# Patient Record
Sex: MALE | Race: WHITE | NOT HISPANIC OR LATINO | Employment: FULL TIME | URBAN - METROPOLITAN AREA
[De-identification: names, ages, dates, MRNs, and addresses within clinical notes are randomized per-mention and may not be internally consistent; named-entity substitution may affect disease eponyms.]

---

## 2024-02-16 ENCOUNTER — OFFICE VISIT (OUTPATIENT)
Dept: PRIMARY CARE | Facility: CLINIC | Age: 69
End: 2024-02-16
Payer: COMMERCIAL

## 2024-02-16 VITALS
WEIGHT: 264.4 LBS | HEIGHT: 75 IN | SYSTOLIC BLOOD PRESSURE: 134 MMHG | BODY MASS INDEX: 32.87 KG/M2 | HEART RATE: 65 BPM | DIASTOLIC BLOOD PRESSURE: 80 MMHG | OXYGEN SATURATION: 96 % | TEMPERATURE: 96.4 F

## 2024-02-16 DIAGNOSIS — K21.9 GASTROESOPHAGEAL REFLUX DISEASE WITHOUT ESOPHAGITIS: ICD-10-CM

## 2024-02-16 DIAGNOSIS — Z11.59 NEED FOR HEPATITIS C SCREENING TEST: ICD-10-CM

## 2024-02-16 DIAGNOSIS — R73.9 HYPERGLYCEMIA: ICD-10-CM

## 2024-02-16 DIAGNOSIS — I25.10 ATHEROSCLEROTIC CARDIOVASCULAR DISEASE: ICD-10-CM

## 2024-02-16 DIAGNOSIS — H61.23 EXCESSIVE CERUMEN IN BOTH EAR CANALS: ICD-10-CM

## 2024-02-16 DIAGNOSIS — Z12.11 COLON CANCER SCREENING: ICD-10-CM

## 2024-02-16 DIAGNOSIS — Z12.5 PROSTATE CANCER SCREENING: ICD-10-CM

## 2024-02-16 DIAGNOSIS — R91.8 PULMONARY NODULES: ICD-10-CM

## 2024-02-16 DIAGNOSIS — Z00.00 ROUTINE ADULT HEALTH MAINTENANCE: Primary | ICD-10-CM

## 2024-02-16 DIAGNOSIS — M79.605 LOWER EXTREMITY PAIN, BILATERAL: ICD-10-CM

## 2024-02-16 DIAGNOSIS — D69.6 THROMBOCYTOPENIA (CMS-HCC): ICD-10-CM

## 2024-02-16 DIAGNOSIS — Z13.6 SCREENING FOR AAA (ABDOMINAL AORTIC ANEURYSM): ICD-10-CM

## 2024-02-16 DIAGNOSIS — M79.604 LOWER EXTREMITY PAIN, BILATERAL: ICD-10-CM

## 2024-02-16 PROBLEM — D72.829 LEUKOCYTOSIS: Status: ACTIVE | Noted: 2024-02-16

## 2024-02-16 PROCEDURE — G0438 PPPS, INITIAL VISIT: HCPCS | Performed by: FAMILY MEDICINE

## 2024-02-16 PROCEDURE — 1160F RVW MEDS BY RX/DR IN RCRD: CPT | Performed by: FAMILY MEDICINE

## 2024-02-16 PROCEDURE — 1124F ACP DISCUSS-NO DSCNMKR DOCD: CPT | Performed by: FAMILY MEDICINE

## 2024-02-16 PROCEDURE — 1159F MED LIST DOCD IN RCRD: CPT | Performed by: FAMILY MEDICINE

## 2024-02-16 PROCEDURE — 1036F TOBACCO NON-USER: CPT | Performed by: FAMILY MEDICINE

## 2024-02-16 PROCEDURE — 1126F AMNT PAIN NOTED NONE PRSNT: CPT | Performed by: FAMILY MEDICINE

## 2024-02-16 PROCEDURE — 69210 REMOVE IMPACTED EAR WAX UNI: CPT | Performed by: FAMILY MEDICINE

## 2024-02-16 RX ORDER — CALCIUM CARB, CITRATE, MALATE 250 MG
CAPSULE ORAL EVERY 24 HOURS
COMMUNITY

## 2024-02-16 RX ORDER — IBUPROFEN 100 MG/5ML
SUSPENSION, ORAL (FINAL DOSE FORM) ORAL
COMMUNITY

## 2024-02-16 ASSESSMENT — PATIENT HEALTH QUESTIONNAIRE - PHQ9
2. FEELING DOWN, DEPRESSED OR HOPELESS: NOT AT ALL
SUM OF ALL RESPONSES TO PHQ9 QUESTIONS 1 AND 2: 0
1. LITTLE INTEREST OR PLEASURE IN DOING THINGS: NOT AT ALL

## 2024-02-16 ASSESSMENT — ENCOUNTER SYMPTOMS
OCCASIONAL FEELINGS OF UNSTEADINESS: 0
LOSS OF SENSATION IN FEET: 0
DEPRESSION: 0

## 2024-02-16 ASSESSMENT — PAIN SCALES - GENERAL: PAINLEVEL: 0-NO PAIN

## 2024-02-16 NOTE — ASSESSMENT & PLAN NOTE
- Excess earwax successfully removed in office  -Recommend routine use of OTC Debrox wax softening drops once a month to help cut down on buildup  -If you have further sensation of fullness, please contact office and we can evaluate

## 2024-02-16 NOTE — PROGRESS NOTES
Outpatient Visit Note    Chief Complaint   Patient presents with    Annual Exam       HPI:  Dar Wolf is a 68 y.o. male a past medical history significant for kidney stones, pulmonary nodules with personal history of tobacco use, thrombocytopenia followed by Hematology and paresthesias who presents to the office for an annual Medicare Wellness Visit.    Patient was last seen on 5/3/2022 for annual well exam    In review, patient had prior blood work completed and August 2019 as part of annual well exam. At that time blood work was completed which was most notable for thrombocytopenia which was identified on initial blood work as well as repeat. Secondary to his persistently low platelets, hematology referral was placed. He was ultimately seen by Dr. Palacios of Hematology on 09/04/2019 which time panel of blood work for evaluation of thrombocytopenia was performed. Platelet count was last checked in system on that same day which remain low but higher than previous readings (84). Patient did not have follow-up.    With his workup, CT lung cancer screening was performed on 09/11/2019 to which multiple small pulmonary nodules were noted. Annual repeat was recommended though not completed. Mild vessel calcifications were additionally noted on CT scans to which cardiology follow-up was recommended. CT calcium cardiac scoring was ordered though not completed.    Last panel blood work was completed on 5/14/2022 including CBC, CMP,  lipid panel, PSA and TSH. Blood work overall was unremarkable outside of persisting thrombocytopenia, mildly elevated LDL cholesterol and hyperglycemia.    Well exam:  Overall, he describes his health as good with no reports of recent illness or hospitalization. He states that his diet is fair. In regards to physical activity, is active in his day-to-day at work though denies any specific physical activity regimen. He denies any significant sleep complaints. He denies issues of chest  pain, shortness of breath, headaches, vision/hearing changes, abdominal pain, vomiting, diarrhea, melena, hematochezia, constipation or urinary symptoms.    Notes frequent sensation of fullness in ears with history of excessive wax.  Additionally notes frequent episodes of bilateral aching leg discomfort at night.  States that this usually happens when laying on his side.  Does cause slight sleep disruption.  Nothing taken for symptom management.  Denies any recent injury.  Also has occasional episodes of indigestion.  Does admit to having prominent episodes last week that did cause throat irritation.  Has had mild respiratory wheeze since that has gradually been improving.  Denies any respiratory complaints or dysphagia.    Preventative Health Maintenance:  In regards to preventative health maintenance, last Tdap received unknown with patient listing allergy to tetanus booster. Flu shot not typically received. In regards to CRC screening, colonoscopy performed in 2019 with recommendation for repeat screening in 5 years. Hepatitis C screening previously completed and negative. COVID-19 vaccine series completed with patient currently due for booster.    Advanced directives: None on file    Hearing screen: reports no difficulty with hearing and passes finger rub test bilaterally    Does the patient use opioid medications: No  Name of medication: N/A  If yes, do they take this medicine appropriately: N/A    How does the patient rate their health status today: good    Cognitive Screen:  AAAx3  to person, place and time: Yes  3 word recall: Apple, Car, Shoe - Immediate recall: Yes         - 5 minutes recall: Yes   Impression: No cognitive deficiency observed during screening or encounter today      Reviewed:   Past Medical History/Allergies:  Yes  Family History:  Yes  Social History:  Yes  Current Medications:  Yes  Vital Signs:  Yes  Advanced Directives:  discussed  Immunizations:  reviewed today  Home Safety:           "          Up & Go test > 30 seconds?  No                   Home have rugs; lack grab bars in bathroom; lack handrail on stairs; have poor lighting?  No                   Hearing difficulties?  No  Geriatric Assessment                   ADL areas requiring assistance:  Does not need help with Dressing, Eating, Ambulating, Toileting, Grooming, Hygiene.                    IADL areas requiring assistance:  Does not need help with Shopping, Housework, Accounting, Transportation, Driving.   Medications: reviewed  Current supplements:  Reviewed and recorded.   Other providers: Reviewed and recorded - Current providers and suppliers: Dr. Fernandez.       PHQ9/GAD7:         Past Medical History:   Diagnosis Date    History of kidney stones     Thrombocytopenia (CMS/[facility])         Current Medications  Current Outpatient Medications   Medication Instructions    ascorbic acid (Vitamin C) 1,000 mg tablet Vitamin C    multivit with minerals/lutein (MULTIVITAMIN 50 PLUS ORAL) Multivitamin    potassium citrate 99 mg capsule Every 24 hours        Allergies  Allergies   Allergen Reactions    Tetanus Vaccines And Toxoid Hives and Swelling     States reaction occurred with \"animal version of tetanus vaccine\"    Eczema Moisturizing Cream Unknown    Tetanus And Diphther. Tox (Pf) Hives        Immunizations  Immunization History   Administered Date(s) Administered    Influenza, seasonal, injectable, preservative free 11/12/2016    Pfizer Purple Cap SARS-CoV-2 08/29/2021, 09/19/2021    Pneumococcal polysaccharide vaccine, 23-valent, age 2 years and older (PNEUMOVAX 23) 05/03/2022        History reviewed. No pertinent surgical history.  Family History   Problem Relation Name Age of Onset    Stroke Mother       Social History     Tobacco Use    Smoking status: Former     Types: Cigarettes    Smokeless tobacco: Never   Vaping Use    Vaping Use: Never used   Substance Use Topics    Alcohol use: Yes    Drug use: Never     Tobacco Use: Medium Risk " (2/16/2024)    Patient History     Smoking Tobacco Use: Former     Smokeless Tobacco Use: Never     Passive Exposure: Not on file        ROS  All pertinent positive symptoms are included in the history of present illness.  All other systems have been reviewed and are negative and noncontributory to this patient's current ailments.    VITAL SIGNS  Vitals:    02/16/24 0821   BP: 134/80   Pulse: 65   Temp: 35.8 °C (96.4 °F)   SpO2: 96%     Vitals:    02/16/24 0821   Weight: 120 kg (264 lb 6.4 oz)      Body mass index is 32.83 kg/m².     PHYSICAL EXAM  GENERAL APPEARANCE: well nourished, well developed, looks like stated age, in no acute distress, not ill or tired appearing, conversing well.   HEENT: no trauma, normocephalic. PERRLA and EOMI with normal external exam.  Bilateral excess obstructing cerumen, TM's visualized after cerumen removal which were intact with no injection or effusion, no signs of infection. Nares patent, turbinates pink without discharge. Pharynx pink with no exudates or lesions, no enlarged tonsils.   NECK: no nodes, supple without rigidity, no neck mass was observed, no thyromegaly or thyroid nodules.   HEART: regular rate and rhythm, S1 and S2 heard with no murmurs or skipped beats  LUNGS: clear to auscultation bilaterally with no wheezes, crackles or rales.   ABDOMEN: no organomegaly, soft, nontender, nondistended, no guarding/rebound/rigidity.   EXTREMITIES: moving all extremities equally with no edema or deformities.   SKIN: normal skin color and pigmentation, normal skin turgor without rash, lesions, or nodules visualized.   NEUROLOGIC EXAM: CN II-XII grossly intact, normal gait, normal balance, 5/5 muscle strength, sensation grossly intact.   PSYCH: mood and affect appropriate; alert and oriented to time, place, person; no difficulty with speech or language.     Preventative Services reviewed with patient and copy printed for patient.    Assessment/Plan   Problem List Items Addressed This  Visit             ICD-10-CM    Atherosclerotic cardiovascular disease I25.10     -Will assess cholesterol levels for gauge on cardiovascular risk factors         Relevant Orders    Lipid Panel    Comprehensive Metabolic Panel    Thrombocytopenia (CMS/[facility]) D69.6     -Will monitor blood counts secondary to history of prior low platelets         Relevant Orders    CBC    Hyperglycemia R73.9     -Secondary to previous elevated blood glucose levels, will check sugar average with blood work ordered today  -Continue to focus on healthy, low-fat diet with moderation of carbohydrates         Relevant Orders    Hemoglobin A1c    Pulmonary nodules R91.8     - Repeat surveillance chest CT recommended secondary to prior history of pulmonary nodules         Relevant Orders    CT chest wo IV contrast    Excessive cerumen in both ear canals H61.23     - Excess earwax successfully removed in office  -Recommend routine use of OTC Debrox wax softening drops once a month to help cut down on buildup  -If you have further sensation of fullness, please contact office and we can evaluate         Lower extremity pain, bilateral M79.604, M79.605     - Likely that pain at nighttime is related to underlying arthritis  -Recommend trial of taking 500-1000 mg of Tylenol in the evening to see if this cuts down on discomfort/sleep disruption  -If symptoms persist can coordinate further evaluation with possible imaging         Gastroesophageal reflux disease without esophagitis K21.9     - Recommend moderation of greasy, spicy, acidic foods and eating heavier meals closer to laying down at night  -If reflux episodes continue, would recommend trial of acid reducing medication          Other Visit Diagnoses         Codes    Routine adult health maintenance    -  Primary Z00.00    Relevant Orders    Lipid Panel    Comprehensive Metabolic Panel    CBC    Prostate Spec.Ag,Screen    Hepatitis C antibody    Vascular US abdominal aorta anuerysm AAA  screening    Prostate cancer screening     Z12.5    Relevant Orders    Prostate Spec.Ag,Screen    Need for hepatitis C screening test     Z11.59    Relevant Orders    Hepatitis C antibody    Colon cancer screening     Z12.11    Screening for AAA (abdominal aortic aneurysm)     Z13.6    Relevant Orders    Vascular US abdominal aorta anuerysm AAA screening            Additional Visit Plans:  Notes:  PREVENTATIVE HEALTH SCREENINGS INCLUDED:  - Blood pressure screen.  - Blood work may include a cholesterol and diabetes screen if risk factors exist (overweight, high blood pressure etc); screening for sexually transmitted infections; a one time HIV screen for all individuals, and a one time Hepatitis C Virus screen for those born between 4065-6516.  - I encourage you to eat a low-fat, moderate-carbohydrate, low-calorie diet to maintain a normal BMI (under 25) to reduce heart disease, and risk for diabetes  - Moderate intensity exercise for 30 minutes 5 days per week is recommended  - Along with recommendations for nutrition and exercise discussed today helpful resources recommended by the American Academy of Family Practice can be found at www.familydoctor.org or www.choosemyplate.gov    - Colon cancer screening for all ages 45-75 or 85 years old periodically depending on results.  - Cervical cancer screening (pap test) in women between 21-65 years old, periodically depending on results.  - Mammogram screening for breast cancer in women starting at 40-50 years and every 1-2 years.  - For men and women who have a 30 pack year smoking history and currently smoke or have quit in the past 15 years, screening for lung cancer with a yearly low dose CT scan is recommended starting at age 55 until age 80 years  - For men only who have smoked 100+ cigarettes anytime in their lifetime, a one time ultrasound screening for for abdominal aortic aneurysms starting at age 65 until 75 years old  - Bone density screening (DEXA) for  osteoporosis in women aged 65 years and older, once every two years if needed.    IMMUNIZATIONS:  - Flu shot annually.  - Tetanus booster every 10 years.  - Two pneumococcal vaccinations after 65 years old.  - Shingles vaccine for those 50 years or older.     This was a shared decision making visit.    Next Wellness Exam Due  In 1 year from today    Counseling:       Medication education:         Education:  The patient is counseled regarding potential side-effects of all new medications        Understanding:  Patient expressed understanding        Adherence:  No barriers to adherence identified

## 2024-02-16 NOTE — ASSESSMENT & PLAN NOTE
-Secondary to previous elevated blood glucose levels, will check sugar average with blood work ordered today  -Continue to focus on healthy, low-fat diet with moderation of carbohydrates

## 2024-02-16 NOTE — ASSESSMENT & PLAN NOTE
- Recommend moderation of greasy, spicy, acidic foods and eating heavier meals closer to laying down at night  -If reflux episodes continue, would recommend trial of acid reducing medication

## 2024-02-16 NOTE — PATIENT INSTRUCTIONS
Problem List Items Addressed This Visit             ICD-10-CM    Atherosclerotic cardiovascular disease I25.10     -Will assess cholesterol levels for gauge on cardiovascular risk factors         Relevant Orders    Lipid Panel    Comprehensive Metabolic Panel    Thrombocytopenia (CMS/HCC) D69.6     -Will monitor blood counts secondary to history of prior low platelets         Relevant Orders    CBC    Hyperglycemia R73.9     -Secondary to previous elevated blood glucose levels, will check sugar average with blood work ordered today  -Continue to focus on healthy, low-fat diet with moderation of carbohydrates         Relevant Orders    Hemoglobin A1c    Pulmonary nodules R91.8     - Repeat surveillance chest CT recommended secondary to prior history of pulmonary nodules         Relevant Orders    CT chest wo IV contrast    Excessive cerumen in both ear canals H61.23     - Excess earwax successfully removed in office  -Recommend routine use of OTC Debrox wax softening drops once a month to help cut down on buildup  -If you have further sensation of fullness, please contact office and we can evaluate         Lower extremity pain, bilateral M79.604, M79.605     - Likely that pain at nighttime is related to underlying arthritis  -Recommend trial of taking 500-1000 mg of Tylenol in the evening to see if this cuts down on discomfort/sleep disruption  -If symptoms persist can coordinate further evaluation with possible imaging         Gastroesophageal reflux disease without esophagitis K21.9     - Recommend moderation of greasy, spicy, acidic foods and eating heavier meals closer to laying down at night  -If reflux episodes continue, would recommend trial of acid reducing medication          Other Visit Diagnoses         Codes    Routine adult health maintenance    -  Primary Z00.00    Relevant Orders    Lipid Panel    Comprehensive Metabolic Panel    CBC    Prostate Spec.Ag,Screen    Hepatitis C antibody    Vascular US  abdominal aorta anuerysm AAA screening    Prostate cancer screening     Z12.5    Relevant Orders    Prostate Spec.Ag,Screen    Need for hepatitis C screening test     Z11.59    Relevant Orders    Hepatitis C antibody    Colon cancer screening     Z12.11    Screening for AAA (abdominal aortic aneurysm)     Z13.6    Relevant Orders    Vascular US abdominal aorta anuerysm AAA screening            Additional Visit Plans:  Notes:  PREVENTATIVE HEALTH SCREENINGS INCLUDED:  - Blood pressure screen.  - Blood work may include a cholesterol and diabetes screen if risk factors exist (overweight, high blood pressure etc); screening for sexually transmitted infections; a one time HIV screen for all individuals, and a one time Hepatitis C Virus screen for those born between 0463-0045.  - I encourage you to eat a low-fat, moderate-carbohydrate, low-calorie diet to maintain a normal BMI (under 25) to reduce heart disease, and risk for diabetes  - Moderate intensity exercise for 30 minutes 5 days per week is recommended  - Along with recommendations for nutrition and exercise discussed today helpful resources recommended by the American Academy of Family Practice can be found at www.familydoctor.org or www.choosemyplate.gov    - Colon cancer screening for all ages 45-75 or 85 years old periodically depending on results.  - Cervical cancer screening (pap test) in women between 21-65 years old, periodically depending on results.  - Mammogram screening for breast cancer in women starting at 40-50 years and every 1-2 years.  - For men and women who have a 30 pack year smoking history and currently smoke or have quit in the past 15 years, screening for lung cancer with a yearly low dose CT scan is recommended starting at age 55 until age 80 years  - For men only who have smoked 100+ cigarettes anytime in their lifetime, a one time ultrasound screening for for abdominal aortic aneurysms starting at age 65 until 75 years old  - Bone  density screening (DEXA) for osteoporosis in women aged 65 years and older, once every two years if needed.    IMMUNIZATIONS:  - Flu shot annually.  - Tetanus booster every 10 years.  - Two pneumococcal vaccinations after 65 years old.  - Shingles vaccine for those 50 years or older.     This was a shared decision making visit.    Next Wellness Exam Due  In 1 year from today    Counseling:       Medication education:         Education:  The patient is counseled regarding potential side-effects of all new medications        Understanding:  Patient expressed understanding        Adherence:  No barriers to adherence identified  02/16/24   8:49 AM

## 2024-02-16 NOTE — ASSESSMENT & PLAN NOTE
- Likely that pain at nighttime is related to underlying arthritis  -Recommend trial of taking 500-1000 mg of Tylenol in the evening to see if this cuts down on discomfort/sleep disruption  -If symptoms persist can coordinate further evaluation with possible imaging

## 2024-02-22 ENCOUNTER — LAB (OUTPATIENT)
Dept: LAB | Facility: LAB | Age: 69
End: 2024-02-22
Payer: COMMERCIAL

## 2024-02-22 DIAGNOSIS — Z11.59 NEED FOR HEPATITIS C SCREENING TEST: ICD-10-CM

## 2024-02-22 DIAGNOSIS — D69.6 THROMBOCYTOPENIA (CMS-HCC): ICD-10-CM

## 2024-02-22 DIAGNOSIS — I25.10 ATHEROSCLEROTIC CARDIOVASCULAR DISEASE: ICD-10-CM

## 2024-02-22 DIAGNOSIS — Z12.5 PROSTATE CANCER SCREENING: ICD-10-CM

## 2024-02-22 DIAGNOSIS — Z00.00 ROUTINE ADULT HEALTH MAINTENANCE: ICD-10-CM

## 2024-02-22 DIAGNOSIS — R73.9 HYPERGLYCEMIA: ICD-10-CM

## 2024-02-22 LAB
ALBUMIN SERPL-MCNC: 4.4 G/DL (ref 3.5–5)
ALP BLD-CCNC: 79 U/L (ref 35–125)
ALT SERPL-CCNC: 29 U/L (ref 5–40)
ANION GAP SERPL CALC-SCNC: 11 MMOL/L
AST SERPL-CCNC: 27 U/L (ref 5–40)
BILIRUB SERPL-MCNC: 0.6 MG/DL (ref 0.1–1.2)
BUN SERPL-MCNC: 21 MG/DL (ref 8–25)
CALCIUM SERPL-MCNC: 9.5 MG/DL (ref 8.5–10.4)
CHLORIDE SERPL-SCNC: 103 MMOL/L (ref 97–107)
CHOLEST SERPL-MCNC: 193 MG/DL (ref 133–200)
CHOLEST/HDLC SERPL: 5.4 {RATIO}
CO2 SERPL-SCNC: 25 MMOL/L (ref 24–31)
CREAT SERPL-MCNC: 0.9 MG/DL (ref 0.4–1.6)
EGFRCR SERPLBLD CKD-EPI 2021: >90 ML/MIN/1.73M*2
ERYTHROCYTE [DISTWIDTH] IN BLOOD BY AUTOMATED COUNT: 12.2 % (ref 11.5–14.5)
EST. AVERAGE GLUCOSE BLD GHB EST-MCNC: 126 MG/DL
GLUCOSE SERPL-MCNC: 124 MG/DL (ref 65–99)
HBA1C MFR BLD: 6 %
HCT VFR BLD AUTO: 43.8 % (ref 41–52)
HCV AB SER QL: NONREACTIVE
HDLC SERPL-MCNC: 36 MG/DL
HGB BLD-MCNC: 14.6 G/DL (ref 13.5–17.5)
LDLC SERPL CALC-MCNC: 129 MG/DL (ref 65–130)
MCH RBC QN AUTO: 30.9 PG (ref 26–34)
MCHC RBC AUTO-ENTMCNC: 33.3 G/DL (ref 32–36)
MCV RBC AUTO: 93 FL (ref 80–100)
NRBC BLD-RTO: 0 /100 WBCS (ref 0–0)
PLATELET # BLD AUTO: 94 X10*3/UL (ref 150–450)
POTASSIUM SERPL-SCNC: 4.5 MMOL/L (ref 3.4–5.1)
PROT SERPL-MCNC: 6.9 G/DL (ref 5.9–7.9)
PSA SERPL-MCNC: 0.3 NG/ML
RBC # BLD AUTO: 4.73 X10*6/UL (ref 4.5–5.9)
SODIUM SERPL-SCNC: 139 MMOL/L (ref 133–145)
TRIGL SERPL-MCNC: 139 MG/DL (ref 40–150)
WBC # BLD AUTO: 5.5 X10*3/UL (ref 4.4–11.3)

## 2024-02-22 PROCEDURE — 80053 COMPREHEN METABOLIC PANEL: CPT

## 2024-02-22 PROCEDURE — 83036 HEMOGLOBIN GLYCOSYLATED A1C: CPT

## 2024-02-22 PROCEDURE — 36415 COLL VENOUS BLD VENIPUNCTURE: CPT

## 2024-02-22 PROCEDURE — 85027 COMPLETE CBC AUTOMATED: CPT

## 2024-02-22 PROCEDURE — 86803 HEPATITIS C AB TEST: CPT

## 2024-02-22 PROCEDURE — 84153 ASSAY OF PSA TOTAL: CPT

## 2024-02-22 PROCEDURE — 80061 LIPID PANEL: CPT

## 2024-03-07 ENCOUNTER — ANCILLARY PROCEDURE (OUTPATIENT)
Dept: VASCULAR MEDICINE | Facility: CLINIC | Age: 69
End: 2024-03-07
Payer: COMMERCIAL

## 2024-03-07 ENCOUNTER — HOSPITAL ENCOUNTER (OUTPATIENT)
Dept: RADIOLOGY | Facility: HOSPITAL | Age: 69
Discharge: HOME | End: 2024-03-07
Payer: COMMERCIAL

## 2024-03-07 DIAGNOSIS — Z13.6 SCREENING FOR AAA (ABDOMINAL AORTIC ANEURYSM): ICD-10-CM

## 2024-03-07 DIAGNOSIS — R91.8 PULMONARY NODULES: ICD-10-CM

## 2024-03-07 DIAGNOSIS — Z00.00 ROUTINE ADULT HEALTH MAINTENANCE: ICD-10-CM

## 2024-03-07 PROCEDURE — 71250 CT THORAX DX C-: CPT

## 2024-03-07 PROCEDURE — 76706 US ABDL AORTA SCREEN AAA: CPT | Performed by: INTERNAL MEDICINE

## 2024-03-07 PROCEDURE — 76706 US ABDL AORTA SCREEN AAA: CPT
